# Patient Record
Sex: MALE | Race: BLACK OR AFRICAN AMERICAN | NOT HISPANIC OR LATINO | Employment: OTHER | URBAN - METROPOLITAN AREA
[De-identification: names, ages, dates, MRNs, and addresses within clinical notes are randomized per-mention and may not be internally consistent; named-entity substitution may affect disease eponyms.]

---

## 2022-09-04 ENCOUNTER — HOSPITAL ENCOUNTER (EMERGENCY)
Facility: HOSPITAL | Age: 62
Discharge: HOME | End: 2022-09-04
Attending: EMERGENCY MEDICINE

## 2022-09-04 VITALS
HEIGHT: 68 IN | BODY MASS INDEX: 34.86 KG/M2 | WEIGHT: 230 LBS | RESPIRATION RATE: 24 BRPM | SYSTOLIC BLOOD PRESSURE: 112 MMHG | TEMPERATURE: 98 F | OXYGEN SATURATION: 98 % | DIASTOLIC BLOOD PRESSURE: 89 MMHG | HEART RATE: 73 BPM

## 2022-09-04 DIAGNOSIS — Z78.9 DIFFICULT INTRAVENOUS ACCESS: ICD-10-CM

## 2022-09-04 DIAGNOSIS — R07.9 CHEST PAIN: Primary | ICD-10-CM

## 2022-09-04 LAB
ALBUMIN SERPL BCP-MCNC: 3.9 G/DL (ref 3.5–5.2)
ALP SERPL-CCNC: 154 U/L (ref 55–135)
ALT SERPL W/O P-5'-P-CCNC: 20 U/L (ref 10–44)
ANION GAP SERPL CALC-SCNC: 8 MMOL/L (ref 8–16)
AST SERPL-CCNC: 21 U/L (ref 10–40)
BASOPHILS # BLD AUTO: 0.03 K/UL (ref 0–0.2)
BASOPHILS NFR BLD: 0.4 % (ref 0–1.9)
BILIRUB SERPL-MCNC: 0.6 MG/DL (ref 0.1–1)
BNP SERPL-MCNC: <10 PG/ML (ref 0–99)
BUN SERPL-MCNC: 15 MG/DL (ref 8–23)
CALCIUM SERPL-MCNC: 9.4 MG/DL (ref 8.7–10.5)
CHLORIDE SERPL-SCNC: 102 MMOL/L (ref 95–110)
CO2 SERPL-SCNC: 28 MMOL/L (ref 23–29)
CREAT SERPL-MCNC: 1.3 MG/DL (ref 0.5–1.4)
DIFFERENTIAL METHOD: NORMAL
EOSINOPHIL # BLD AUTO: 0.3 K/UL (ref 0–0.5)
EOSINOPHIL NFR BLD: 4.2 % (ref 0–8)
ERYTHROCYTE [DISTWIDTH] IN BLOOD BY AUTOMATED COUNT: 13.6 % (ref 11.5–14.5)
EST. GFR  (NO RACE VARIABLE): >60 ML/MIN/1.73 M^2
GLUCOSE SERPL-MCNC: 169 MG/DL (ref 70–110)
HCT VFR BLD AUTO: 41.6 % (ref 40–54)
HGB BLD-MCNC: 14.6 G/DL (ref 14–18)
IMM GRANULOCYTES # BLD AUTO: 0.02 K/UL (ref 0–0.04)
IMM GRANULOCYTES NFR BLD AUTO: 0.3 % (ref 0–0.5)
LYMPHOCYTES # BLD AUTO: 3.1 K/UL (ref 1–4.8)
LYMPHOCYTES NFR BLD: 41.3 % (ref 18–48)
MCH RBC QN AUTO: 29 PG (ref 27–31)
MCHC RBC AUTO-ENTMCNC: 35.1 G/DL (ref 32–36)
MCV RBC AUTO: 83 FL (ref 82–98)
MONOCYTES # BLD AUTO: 0.4 K/UL (ref 0.3–1)
MONOCYTES NFR BLD: 5.8 % (ref 4–15)
NEUTROPHILS # BLD AUTO: 3.5 K/UL (ref 1.8–7.7)
NEUTROPHILS NFR BLD: 48 % (ref 38–73)
NRBC BLD-RTO: 0 /100 WBC
PLATELET # BLD AUTO: NORMAL K/UL (ref 150–450)
PMV BLD AUTO: NORMAL FL (ref 9.2–12.9)
POTASSIUM SERPL-SCNC: 4 MMOL/L (ref 3.5–5.1)
PROT SERPL-MCNC: 7.7 G/DL (ref 6–8.4)
RBC # BLD AUTO: 5.04 M/UL (ref 4.6–6.2)
SODIUM SERPL-SCNC: 138 MMOL/L (ref 136–145)
TROPONIN I SERPL DL<=0.01 NG/ML-MCNC: <0.006 NG/ML (ref 0–0.03)
TROPONIN I SERPL DL<=0.01 NG/ML-MCNC: <0.006 NG/ML (ref 0–0.03)
WBC # BLD AUTO: 7.38 K/UL (ref 3.9–12.7)

## 2022-09-04 PROCEDURE — 93010 ELECTROCARDIOGRAM REPORT: CPT | Mod: ,,, | Performed by: INTERNAL MEDICINE

## 2022-09-04 PROCEDURE — 93005 ELECTROCARDIOGRAM TRACING: CPT

## 2022-09-04 PROCEDURE — 25000003 PHARM REV CODE 250: Performed by: EMERGENCY MEDICINE

## 2022-09-04 PROCEDURE — 36000 PLACE NEEDLE IN VEIN: CPT

## 2022-09-04 PROCEDURE — 84484 ASSAY OF TROPONIN QUANT: CPT | Performed by: EMERGENCY MEDICINE

## 2022-09-04 PROCEDURE — 99285 EMERGENCY DEPT VISIT HI MDM: CPT | Mod: 25

## 2022-09-04 PROCEDURE — 80053 COMPREHEN METABOLIC PANEL: CPT | Performed by: EMERGENCY MEDICINE

## 2022-09-04 PROCEDURE — 83880 ASSAY OF NATRIURETIC PEPTIDE: CPT | Performed by: EMERGENCY MEDICINE

## 2022-09-04 PROCEDURE — 25000242 PHARM REV CODE 250 ALT 637 W/ HCPCS: Performed by: EMERGENCY MEDICINE

## 2022-09-04 PROCEDURE — 85025 COMPLETE CBC W/AUTO DIFF WBC: CPT | Performed by: EMERGENCY MEDICINE

## 2022-09-04 PROCEDURE — 93010 EKG 12-LEAD: ICD-10-PCS | Mod: ,,, | Performed by: INTERNAL MEDICINE

## 2022-09-04 RX ORDER — CLOPIDOGREL BISULFATE 75 MG/1
75 TABLET ORAL DAILY
COMMUNITY

## 2022-09-04 RX ORDER — LISINOPRIL 20 MG/1
20 TABLET ORAL DAILY
COMMUNITY

## 2022-09-04 RX ORDER — ASPIRIN 81 MG/1
81 TABLET ORAL DAILY
COMMUNITY

## 2022-09-04 RX ORDER — ATORVASTATIN CALCIUM 20 MG/1
20 TABLET, FILM COATED ORAL DAILY
COMMUNITY

## 2022-09-04 RX ORDER — INSULIN GLARGINE 100 [IU]/ML
INJECTION, SOLUTION SUBCUTANEOUS NIGHTLY
COMMUNITY

## 2022-09-04 RX ORDER — ACETAMINOPHEN 500 MG
1000 TABLET ORAL
Status: DISCONTINUED | OUTPATIENT
Start: 2022-09-04 | End: 2022-09-05 | Stop reason: HOSPADM

## 2022-09-04 RX ORDER — METOPROLOL TARTRATE 25 MG/1
25 TABLET, FILM COATED ORAL 2 TIMES DAILY
COMMUNITY

## 2022-09-04 RX ORDER — FUROSEMIDE 40 MG/1
40 TABLET ORAL 2 TIMES DAILY
COMMUNITY

## 2022-09-04 RX ORDER — HYDROXYZINE PAMOATE 25 MG/1
25 CAPSULE ORAL
Status: COMPLETED | OUTPATIENT
Start: 2022-09-04 | End: 2022-09-04

## 2022-09-04 RX ORDER — NITROGLYCERIN 0.4 MG/1
0.4 TABLET SUBLINGUAL
Status: COMPLETED | OUTPATIENT
Start: 2022-09-04 | End: 2022-09-04

## 2022-09-04 RX ADMIN — NITROGLYCERIN 0.4 MG: 0.4 TABLET, ORALLY DISINTEGRATING SUBLINGUAL at 09:09

## 2022-09-04 RX ADMIN — HYDROXYZINE PAMOATE 25 MG: 25 CAPSULE ORAL at 08:09

## 2022-09-04 RX ADMIN — NITROGLYCERIN 0.4 MG: 0.4 TABLET, ORALLY DISINTEGRATING SUBLINGUAL at 10:09

## 2022-09-05 NOTE — DISCHARGE INSTRUCTIONS
Thank you for coming to our Emergency Department today. It is important to remember that some problems are difficult to diagnose and may not be found during your Emergency Department visit. Be sure to follow up with your primary care doctor and review all labs/imaging/tests that were performed during this visit with them. Some labs/tests may be outside of the normal range and require non-emergent follow-up and further investigation to help diagnose/exclude/prevent complications or other medical conditions.    If you do not have a primary care doctor, you may contact the one listed on your discharge paperwork or you may also call the [facility] Appointment Desk at [phone] to schedule an appointment and establish care with one. It is important to your health that you have a primary care doctor.    Please take all medications as directed. All medications may potentially have side-effects and it is impossible to predict which medications may give you side-effects or what side-effects (if any) they will give you.. If you feel that you are having a negative effect or side-effect of any medication you should immediately stop taking them and seek medical attention. If you feel that you are having a life-threatening reaction call 911.    Return to the ER with any questions/concerns, new/concerning symptoms, worsening or failure to improve.     Do not drive, swim, climb to height, take a bath or make any important decisions for 24 hours if you have received any pain medications, sedatives or mood altering drugs during your ER visit.        BELOW THIS LINE ONLY APPLIES IF YOU HAVE A COVID TEST PENDING OR IF YOU HAVE BEEN DIAGNOSED WITH COVID:  Please access [facility] to review the results of your test. Until the results of your COVID test return, you should isolate yourself so as not to potentially spread illness to others.   If your COVID test returns positive, you should isolate yourself so as not to spread  illness to others. After five full days, if you are feeling better and you have not had fever for 24 hours, you can return to your typical daily activities, but you must wear a mask around others for an additional 5 days.   If your COVID test returns negative and you are either unvaccinated or more than six months out from your two-dose vaccine and are not yet boosted, you should still quarantine for 5 full days followed by strict mask use for an additional 5 full days.   If your COVID test returns negative and you have received your 2-dose initial vaccine as well as a booster, you should continue strict mask use for 10 full days after the exposure.  For all those exposed, best practice includes a test at day 5 after the exposure. This can be a home test or a test through one of the many testing centers throughout our community.   Masking is always advised to limit the spread of COVID. Cdc.gov is an excellent site to obtain the latest up to date recommendations regarding COVID and COVID testing.     CDC Testing and Quarantine Guidelines for patients with exposure to a known-positive COVID-19 person:  A close exposure is defined as anyone who has had an exposure (masked or unmasked) to a known COVID -19 positive person within 6 feet of someone for a cumulative total of 15 minutes or more over a 24-hour period.   Vaccinated and/or if you recently had a positive covid test within 90 days do NOT need to quarantine after contact with someone who had COVID-19 unless you develop symptoms.   Fully vaccinated people who have not had a positive test within 90 days, should get tested 3-5 days after their exposure, even if they don't have symptoms and wear a mask indoors in public for 14 days following exposure or until their test result is negative.      Unvaccinated and/or NOT had a positive test within 90 days and meet close exposure  You are required by CDC guidelines to quarantine for at least 5 days from time of  exposure followed by 5 days of strict masking. It is recommended, but not required to test after 5 days, unless you develop symptoms, in which case you should test at that time.  If you get tested after 5 days and your test is positive, your 5 day period of isolation starts the day of the positive test.    If your exposure does not meet the above definition, you can return to your normal daily activities to include social distancing, wearing a mask and frequent handwashing.      Here is a link to guidance from the CDC:  https://www.cdc.gov/media/releases/2021/s1227-isolation-quarantine-guidance.html      [facility]  [facility] Testing Sites:  https://ldh.la.gov/page/3934      [facility] website with testing locations and guidance:  [email or web address]

## 2022-09-05 NOTE — ED PROVIDER NOTES
"Encounter Date: 9/4/2022    SCRIBE #1 NOTE: I, Eugenia Gustafson, am scribing for, and in the presence of,  Erica Colorado MD. I have scribed the following portions of the note - Other sections scribed: HPI, ROS, PE.     History     Chief Complaint   Patient presents with    Chest Pain     Pt arrived via EMS from [facility]. Pt c/o Cx tightness radiating to Left neck and jaw x1 day. Per EMS, "pt was seen at [facility] today for same complaint. Pt was d/c from without incident then walked across the street to [facility] and called 911 for transport to [facility]." Pt denies SOB, NVD, weakness, dizziness, numbness, or tingling.      Chavo Curran is a 61 y.o. male, with a past medical history of HTN, DM, and heart attack (last 2012), who presents to the ED with constant chest pain on left side radiating to neck and arm for 2 hours. Patient states symptoms started earlier around 5:30-6pm. He states he has had 3 heart attacks in the past with last stent placed in 2012, followed by Dr. Can in [place]. Associated symptoms include vomiting, nausea, and shortness of breath. Patient states he feels itchy because he ate peaches unknowingly while at [employer] today, noting he is allergic. Patient endorses taking 4 baby aspirins and 3 nitro PTA with no immediate relief noted. Compliant with lisinopril, atorvastatin, ASA, plavix, lasix, and metoprolol. No other exacerbating or alleviating factors. Patient denies cough, or other associated symptoms.      The history is provided by the patient. No  was used.   Review of patient's allergies indicates:   Allergen Reactions    Peaches [peach (prunus persica)]     Hydrocodone Hives and Itching    Oxycodone Hives and Itching    Penicillins Hives and Itching     Past Medical History:   Diagnosis Date    Diabetes mellitus     Hypertension      Past Surgical History:   Procedure Laterality Date    CARDIAC SURGERY       History reviewed. No pertinent family history.  Social " History     Tobacco Use    Smoking status: Never    Smokeless tobacco: Never   Substance Use Topics    Alcohol use: Never    Drug use: Never     Review of Systems   Constitutional:  Negative for chills and fever.   HENT:  Negative for congestion and sore throat.    Eyes:  Negative for visual disturbance.   Respiratory:  Positive for shortness of breath. Negative for cough.    Cardiovascular:  Positive for chest pain (left sided, radiates to left sided jaw and arm).   Gastrointestinal:  Positive for nausea and vomiting. Negative for abdominal pain.   Genitourinary:  Negative for dysuria.   Skin:  Negative for rash.        (+) pruritus    Neurological:  Negative for headaches.   Psychiatric/Behavioral:  Negative for confusion.      Physical Exam     Initial Vitals [09/04/22 1937]   BP Pulse Resp Temp SpO2   110/76 72 16 98.2 °F (36.8 °C) 99 %      MAP       --         Physical Exam    Nursing note and vitals reviewed.  Constitutional: He appears well-developed and well-nourished. He is not diaphoretic. No distress.   HENT:   Head: Normocephalic and atraumatic.   Mouth/Throat: Oropharynx is clear and moist.   Eyes: EOM are normal. Pupils are equal, round, and reactive to light.   Neck: Neck supple.   Cardiovascular:  Normal rate and regular rhythm.           Pulses:       Radial pulses are 2+ on the right side and 2+ on the left side.   Pulmonary/Chest: Breath sounds normal. No respiratory distress.   Abdominal: Abdomen is soft. Bowel sounds are normal.   Musculoskeletal:         General: No edema.      Cervical back: Neck supple.     Neurological: He is alert and oriented to person, place, and time.   Skin: Skin is warm and dry.   Psychiatric: His affect is angry. He is agitated.       ED Course   Procedures  Labs Reviewed   COMPREHENSIVE METABOLIC PANEL - Abnormal; Notable for the following components:       Result Value    Glucose 169 (*)     Alkaline Phosphatase 154 (*)     All other components within normal limits    CBC W/ AUTO DIFFERENTIAL   TROPONIN I   B-TYPE NATRIURETIC PEPTIDE   TROPONIN I        ECG Results              Repeat EKG 12-lead (Preliminary result)  Result time 09/04/22 22:43:23      ED Interpretation by Erica Colorado MD (09/04/22 22:43:23, [facility], Emergency Medicine)    Normal sinus rhythm, rate 74 beats per minute, normal HI interval,  milliseconds.  No STEMI.                                     EKG 12-lead (Preliminary result)  Result time 09/04/22 20:36:14      ED Interpretation by Erica Colorado MD (09/04/22 20:36:14, [facility] Dept, Emergency Medicine)    Normal sinus rhythm, rate 85 beats per minute, normal HI interval,  milliseconds, no STEMI.  No malignant dysrhythmia.                                  Imaging Results              X-Ray Chest AP Portable (Final result)  Result time 09/04/22 22:16:05      Final result by Chung Tam DO (09/04/22 22:16:05)                   Impression:      No acute abnormality.      Electronically signed by: Chung Tam  Date:    09/04/2022  Time:    22:16               Narrative:    EXAMINATION:  XR CHEST AP PORTABLE    CLINICAL HISTORY:  Chest Pain;    TECHNIQUE:  Single frontal view of the chest was performed.    COMPARISON:  None    FINDINGS:  The lungs are well expanded and clear. No focal opacities are seen. The pleural spaces are clear.    The cardiac silhouette is unremarkable.    There is dextroconvex scoliosis thoracic spine.  The remaining visualized osseous structures are unremarkable.                                       Medications   nitroGLYCERIN SL tablet 0.4 mg (0.4 mg Sublingual Given 9/4/22 2221)   hydrOXYzine pamoate capsule 25 mg (25 mg Oral Given 9/4/22 2056)     Medical Decision Making:   History:   Old Medical Records: I decided to obtain old medical records.  Old Records Summarized: records from another hospital.       <> Summary of Records: Patient has a separate chart using a  slightly different birthday (11/17/1958):  The patient is noted to have multiple charts with birthdays with slightly different date of birth.  MRN [id number] shows the following note from October 6, 2019:    I reviewed the patient's past medical history that is available on epic.  Apparently he was admitted last year with similar complaints and receive significant amounts of pain relievers as well as nitroglycerin was admitted to NICU because nothing was working for his pain and was placed on a nitroglycerin drip.  This was followed by him refusing a left heart catheterization initially but later eventually consented to the left heart catheterization.  Catheterization results revealed normal coronary arteries with no evidence of coronary stents.  The patient was then discharged home after several normal EKGs and cardiac enzymes were all normal and the patient was deemed to not have coronary ischemia, especially after his normal coronary catheterization and the fact that the patient was not of front about having numerous stents .  He was diagnosed with possible malingering behavior.        Hospital Course noted from Discharge summary 9/1/2018:  Mr. Tony was transferred 08/28 for further evaluation of chest pain. On admission he had complaints of 10/10 chest pain and was treated with NTG paste and IV morphine without relief. He was evaluated by Interventional Cardiology, who recommended [facility] however patient refused until son who is a Cardiologist arrives at [facility]; we have been unsuccessful in contacting him via phone number provided by patient. Later morning 08/29 had complaints of chest pain 10/10, provider to room, no distress noted and was met with a belligerent ranting. Educated on need to obtain stat EKG and administer SL NTG, he was agreeable and after three SL NTG reported pain improved to 7/10; repeat EKG with no acute findings. Discussed case with Interventional Cardiology, due to continued chest pain and  refusal of [facility] at this time decision made to transfer to CCU for NTG gtt. After transfer to ICU bed he was evaluated by CCU team and determined ECG and markers showed no objective evidence of ischemia; and given magnitude and duration of pain and negative markers, pain unlikely to be cardiac ischemia. Thus he was returned to CSU, initiated on ISMN with SL NTG PRN chest pain.  2D echo performed, no WMAs, mild AR, and EF 65-70%. IV access was been extremely difficult, PIV obtained by PICC team after return to CSU.  stress echo completed 8/30 with evidence of ischemic response involving the mid inferoseptum and basal inferoseptum. Post procedure had some nausea with low blood pressure and ongoing chest pain; fellow evaluated in echo lab and recommended potential ICU transfer for NTG gtt if pain continued. Discussed stress findings and patient condition with Co-managed Cardiology decision made to remain in CSU; NTG paste initiated and provided PO pain medications. During discussion of plan of care patient remained aggressive, argumentative, and belligerent to NP and RN however did report understanding and would comply with NTG paste administration. On evening of 08/30/18 developed chest pain left sided with radiating to his neck and left arm; no EKG / telemetry changes noted, troponin elevated to 4.5 then was given SL NTG w/o pain relief, transferred back to CCU for further management of pain. On 8/31 AM patient stated left sided sensory and motor changes - stroke code called and CT/MRI brain performed which were negative for stroke. He was evaluated by Vascular Neurology and found exam concerning for psychiatric issue; malingering vs other. Patient finally agreed to [facility] 08/31 which noted NO obstructive CAD and NO coronary stents; and EKG not compatible with vasospasm.  After cath he was stepped down to hospital medicine 09/01, troponin down trended, no further complaints of chest pain. He refused psychiatry consult  wished to be discharged.     Initial Assessment:    61-year-old male with history of CAD, previous stents, presents with left-sided chest pain that radiates to his left neck, associated with shortness of breath, nausea and vomiting.  The patient also endorses itching because he ate peaches at [employer] today.  On exam, patient is well-appearing, he is not appear distressed.  His vitals are reassuring.  His EKG is normal.    On exam, the patient appears to be resting comfortably.  He has 2+ radial pulses bilaterally, there is no or pharyngeal swelling, I do not appreciate any hives, there is no wheezing. Differential includes but not limited to ACS, GERD, MSK pain.  Low suspicion for dissection based on patient's physical exam and vitals.  Workup initiated with labs, chest x-ray.   patient took 4 baby aspirin prior to arrival and reports taking 3 nitroglycerin.  Will treat with hydroxyzine for itching.    Clinical Tests:   Lab Tests: Ordered and Reviewed  Radiological Study: Ordered and Reviewed  Medical Tests: Ordered and Reviewed  ED Management:  After searching for patient's previous records, I did see that he has another chart with a slightly different birthday of November 17, 1958.  Apparently the patient reported to registration that he does not know his social security number although he is a .  I have summarized notes from the patient's other chart.  In addition, EMS reported that the patient was picked up after visiting [facility] Emergency Department today and had discharge papers with his name on it.  The patient denies being in the [facility] emergency department today and states he was there visiting a sick family member.  I did try to confront the patient regarding my findings on this other chart and he adamantly declines at this was him.  I am very concerned that this patient is malingering.  The patient has very difficult IV access but we were able to obtain ultrasound-guided IV  "access in the left upper extremity.        Scribe Attestation:   Scribe #1: I performed the above scribed service and the documentation accurately describes the services I performed. I attest to the accuracy of the note.      ED Course as of 09/05/22 1410   Sun Sep 04, 2022   0619 I called and discussed care with the [facility] Physician who took care of patient earlier- states patient checked in with a birth date of November 17, 1956.  He states he was able to review the patient's chart, patient presented to [facility] yesterday with complaints of chest pain.  He had 2 negative troponins overnight, he was admitted and started on a heparin drip due to continued chest pain but had to have a catheter placed in his left groin due to poor IV access. Dr. Moscoso noted the patient had a bandage to the left groin on his assessment.  The patient left AMA this morning and presented to [facility].  When Dr. Moscoso confronted him about this history, the patient left against medical advice and refused labs. []   1085 Patient's repeat troponin negative.  He has had 2 EKGs here that are normal.  I have a high concern for malingering in this patient, he told me his last stent was in 2012, however, have reviewed other charts under his name that show he had a left heart catheterization in 2018 that did not show any stents.  As his vitals are reassuring, his chest x-ray shows no acute finding, cardiac enzymes in the ED are negative, I will discharge patient with for outpatient follow-up.  I will refer him to Cardiology to establish care.  When I reviewed this plan with patient, he asked me not to place a referral for him because he is not going to follow-up any ways and states that he is leaving to go back to [place] and "just wanted to get checked out before (he left)."  I did ask that the patient bring his previous medical records with him to his appointment.  I did notify the house supervisor my concern that the patient is " using different birth dates and has multiple charts. [LH]      ED Course User Index  [LH] Erica Colorado MD             Clinical Impression:   Final diagnoses:  [R07.9] Chest pain (Primary)  [Z78.9] Difficult intravenous access        ED Disposition Condition    Discharge Stable        I, Erica Colorado MD, personally performed the services described in this documentation. All medical record entries made by the scribe were at my direction and in my presence. I have reviewed the chart and agree that the record reflects my personal performance and is accurate and complete.   ED Prescriptions    None       Follow-up Information       Follow up With Specialties Details Why Contact Info    Roshan Thomas MD Cardiology, Interventional Cardiology Schedule an appointment as soon as possible for a visit   [address] [facility] [address]  [address]  [facility] [id number] [phone]      [facility] Emergency Medicine  As needed, If symptoms worsen [address]  [facility] [id number] [phone]          This dictation has been generated using M-Modal Fluency Direct dictation; some phonetic errors may occur.        Erica Colorado MD  09/05/22 3171